# Patient Record
Sex: FEMALE | Race: WHITE | NOT HISPANIC OR LATINO | Employment: FULL TIME | URBAN - METROPOLITAN AREA
[De-identification: names, ages, dates, MRNs, and addresses within clinical notes are randomized per-mention and may not be internally consistent; named-entity substitution may affect disease eponyms.]

---

## 2019-12-24 ENCOUNTER — OFFICE VISIT (OUTPATIENT)
Dept: CARDIOLOGY | Facility: CLINIC | Age: 59
End: 2019-12-24

## 2019-12-24 VITALS
SYSTOLIC BLOOD PRESSURE: 132 MMHG | HEART RATE: 101 BPM | HEIGHT: 62 IN | OXYGEN SATURATION: 98 % | BODY MASS INDEX: 29.44 KG/M2 | DIASTOLIC BLOOD PRESSURE: 82 MMHG | RESPIRATION RATE: 16 BRPM | WEIGHT: 160 LBS

## 2019-12-24 DIAGNOSIS — I25.119 CORONARY ARTERY DISEASE WITH ANGINA PECTORIS, UNSPECIFIED VESSEL OR LESION TYPE, UNSPECIFIED WHETHER NATIVE OR TRANSPLANTED HEART (HCC): Primary | ICD-10-CM

## 2019-12-24 DIAGNOSIS — R07.9 CHEST PAIN, UNSPECIFIED TYPE: ICD-10-CM

## 2019-12-24 PROCEDURE — 93000 ELECTROCARDIOGRAM COMPLETE: CPT | Performed by: INTERNAL MEDICINE

## 2019-12-24 PROCEDURE — 99204 OFFICE O/P NEW MOD 45 MIN: CPT | Performed by: INTERNAL MEDICINE

## 2019-12-24 RX ORDER — ATORVASTATIN CALCIUM 20 MG/1
20 TABLET, FILM COATED ORAL DAILY
Qty: 30 TABLET | Refills: 11 | Status: SHIPPED | OUTPATIENT
Start: 2019-12-24

## 2019-12-24 NOTE — PROGRESS NOTES
[facility]      Patient Name: Lou Bone  :1960  Age: 59 y.o.  Encounter Provider:  Tobi Baldwin Jr, MD      Chief Complaint:   Chief Complaint   Patient presents with   • Chest Pain   • Congestive Heart Failure         HPI  Lou Bone is a 59 y.o. female past medical history coronary artery disease recently diagnosed presents for initial evaluation.  Patient was experiencing increased chest pressure when she presented to [facility] on .  Due to the typical nature of the chest pain and the accelerating frequency of symptoms the patient was taken directly for cardiac catheterization.  A left ventriculogram was performed which showed EF 50 to 60%.  Coronary angiogram showed normal left main left circumflex and RCA.  A 99% mid LAD lesion was noted and PCI performed with 2 CHINTAN.  Patient noted a small hematoma with large ecchymosis in the groin post catheterization.  She was discharged on 122 and has continued to experience shortness of air with temporal relationship to ingestion of ticagrelor.  She denies any further chest pain.  She stopped taking her beta-blocker and atorvastatin as she is not fond of taking multiple medications.  She is a lifelong non-smoker and denies alcohol or illicit drug use.      The following portions of the patient's history were reviewed and updated as appropriate: allergies, current medications, past family history, past medical history, past social history, past surgical history and problem list.      Review of Systems   Constitution: Positive for malaise/fatigue. Negative for chills and fever.   HENT: Negative for hoarse voice and sore throat.    Eyes: Negative for double vision and photophobia.   Cardiovascular: Positive for dyspnea on exertion and palpitations. Negative for chest pain, leg swelling, near-syncope, orthopnea, paroxysmal nocturnal dyspnea and syncope.   Respiratory: Positive for shortness of breath. Negative for cough  "and wheezing.    Skin: Negative for poor wound healing and rash.   Musculoskeletal: Negative for arthritis and joint swelling.   Gastrointestinal: Negative for bloating, abdominal pain, hematemesis and hematochezia.   Neurological: Negative for dizziness and focal weakness.   Psychiatric/Behavioral: Negative for depression and suicidal ideas.   All other systems reviewed and are negative.      OBJECTIVE:   Vital Signs  Vitals:    12/24/19 0935   Pulse: 101   Resp: 16   SpO2: 98%     Estimated body mass index is 29.26 kg/m² as calculated from the following:    Height as of this encounter: 157.5 cm (62\").    Weight as of this encounter: 72.6 kg (160 lb).    Physical Exam   Constitutional: She is oriented to person, place, and time. She appears well-developed and well-nourished.   HENT:   Head: Normocephalic and atraumatic.   Eyes: Pupils are equal, round, and reactive to light. Conjunctivae are normal.   Neck: No JVD present. No thyromegaly present.   Cardiovascular: Exam reveals no gallop and no friction rub.   No murmur heard.  Pulmonary/Chest: No respiratory distress. She exhibits no tenderness.   Abdominal: Bowel sounds are normal. She exhibits no distension.   Musculoskeletal: She exhibits no edema or tenderness.   Neurological: She is alert and oriented to person, place, and time.   Skin: No rash noted. No erythema.   1.5 cm diameter hematoma which is firm noted in the area of the femoral arteriotomy.  Large 5 cm ecchymosis noted in that region as well.  Neurovascular intact.   Psychiatric: She has a normal mood and affect. Judgment normal.   Vitals reviewed.        ECG 12 Lead  Date/Time: 12/24/2019 10:34 AM  Performed by: Tobi Baldwin Jr., MD  Authorized by: Tobi Baldwin Jr., MD   Comparison: not compared with previous ECG   Previous ECG: no previous ECG available  Rhythm: sinus rhythm  T inversion: V1 and V2  T flattening: V3 and V4    Clinical impression: abnormal EKG                  ASSESSMENT:      " Diagnosis Plan   1. Chest pain, unspecified type     2. Coronary artery disease with angina pectoris, unspecified vessel or lesion type, unspecified whether native or transplanted heart (CMS/[facility])           PLAN OF CARE:     1. CAD -status post recent cardiac catheterization and PCI to mid LAD.  Continue aspirin and Brilinta.  She will employ a strategy of caffeine ingestion around the time of taking Brilinta.  She is willing to restart metoprolol and atorvastatin.  Check lipid profile in 3 months.  Cardiac rehab referral to [facility] as the patient lives in [place].  2. Hyperlipidemia -as above    Return to clinic 3 months             Discharge Medications           Accurate as of December 24, 2019  9:51 AM. If you have any questions, ask your nurse or doctor.               Continue These Medications      Instructions Start Date   azithromycin 250 MG tablet  Commonly known as:  ZITHROMAX   Take 2 tablets the first day, then 1 tablet daily for 4 days.      escitalopram 10 MG tablet  Commonly known as:  LEXAPRO   10 mg, Oral, Daily      guaiFENesin 600 MG 12 hr tablet  Commonly known as:  MUCINEX   1,200 mg, Oral, 2 Times Daily      meclizine 25 MG tablet  Commonly known as:  ANTIVERT   25 mg, Oral, 4 Times Daily PRN      meclizine 25 MG tablet  Commonly known as:  ANTIVERT   25 mg, Oral, Every 6 Hours PRN      ticagrelor 90 MG tablet tablet  Commonly known as:  BRILINTA   90 mg, Oral, 2 Times Daily             Thank you for allowing me to participate in the care of your patient,      Sincerely,   Tobi Baldwin MD  [facility]  12/24/19  9:51 AM

## 2020-02-27 ENCOUNTER — TELEPHONE (OUTPATIENT)
Dept: CARDIAC REHAB | Facility: HOSPITAL | Age: 60
End: 2020-02-27

## 2020-02-27 NOTE — TELEPHONE ENCOUNTER
02/27/20- left voice message for pt to call back regarding CR. CR order showed up in [facility] LINDA [facility] yesterday afternoon and had been initiated on 12/24/2019 ?  Pt asked to call CR asap to discuss CR.  MARLEY Stinson RN CCRP

## 2020-02-28 ENCOUNTER — DOCUMENTATION (OUTPATIENT)
Dept: CARDIAC REHAB | Facility: HOSPITAL | Age: 60
End: 2020-02-28

## 2020-03-23 ENCOUNTER — TELEPHONE (OUTPATIENT)
Dept: CARDIOLOGY | Facility: CLINIC | Age: 60
End: 2020-03-23

## 2020-03-23 NOTE — TELEPHONE ENCOUNTER
Called went to  and mailbox is full can not leave message. Need to reschedule up coming appt to telehealth.

## 2020-03-23 NOTE — TELEPHONE ENCOUNTER
Spoke with the patient and she stated that she never got the refill of the Brilinta. (the refill was sent to the wrong [facility], she stated)  She stated that she has been off of the medication for over a month.  She is stating that she is not going to come into the office being she was not able to get the prescription pulled to the correct location.  I let her know that I have not received any messages or refill requests.  I did let her know that it was important that she be on the Brilinta and sent in the refill to her correct pharmacy.  I have called and spoken with Rupal Esparza and verified that the prescription has been received.    Bernabe

## 2022-05-20 ENCOUNTER — HOSPITAL ENCOUNTER (EMERGENCY)
Facility: HOSPITAL | Age: 62
Discharge: HOME | End: 2022-05-20
Attending: EMERGENCY MEDICINE | Admitting: EMERGENCY MEDICINE

## 2022-05-20 ENCOUNTER — APPOINTMENT (OUTPATIENT)
Dept: CT IMAGING | Facility: HOSPITAL | Age: 62
End: 2022-05-20

## 2022-05-20 VITALS
BODY MASS INDEX: 33.13 KG/M2 | HEIGHT: 62 IN | SYSTOLIC BLOOD PRESSURE: 128 MMHG | OXYGEN SATURATION: 96 % | WEIGHT: 180 LBS | RESPIRATION RATE: 18 BRPM | TEMPERATURE: 98.3 F | DIASTOLIC BLOOD PRESSURE: 79 MMHG | HEART RATE: 84 BPM

## 2022-05-20 DIAGNOSIS — S00.01XA ABRASION OF SCALP, INITIAL ENCOUNTER: ICD-10-CM

## 2022-05-20 DIAGNOSIS — S09.90XA HEAD INJURY, CLOSED, WITHOUT LOC, INITIAL ENCOUNTER: Primary | ICD-10-CM

## 2022-05-20 DIAGNOSIS — M54.2 ACUTE NECK PAIN: ICD-10-CM

## 2022-05-20 PROCEDURE — 25010000002 TETANUS-DIPHTH-ACELL PERTUSSIS 5-2.5-18.5 LF-MCG/0.5 SUSPENSION PREFILLED SYRINGE: Performed by: NURSE PRACTITIONER

## 2022-05-20 PROCEDURE — 72125 CT NECK SPINE W/O DYE: CPT

## 2022-05-20 PROCEDURE — 90715 TDAP VACCINE 7 YRS/> IM: CPT | Performed by: NURSE PRACTITIONER

## 2022-05-20 PROCEDURE — 99283 EMERGENCY DEPT VISIT LOW MDM: CPT

## 2022-05-20 PROCEDURE — 70450 CT HEAD/BRAIN W/O DYE: CPT

## 2022-05-20 PROCEDURE — 90471 IMMUNIZATION ADMIN: CPT | Performed by: NURSE PRACTITIONER

## 2022-05-20 RX ORDER — TRAZODONE HYDROCHLORIDE 100 MG/1
100 TABLET ORAL NIGHTLY
COMMUNITY

## 2022-05-20 RX ADMIN — TETANUS TOXOID, REDUCED DIPHTHERIA TOXOID AND ACELLULAR PERTUSSIS VACCINE, ADSORBED 0.5 ML: 5; 2.5; 8; 8; 2.5 SUSPENSION INTRAMUSCULAR at 10:39

## 2022-05-20 NOTE — ED PROVIDER NOTES
EMERGENCY DEPARTMENT ENCOUNTER    Room Number:    Date seen:  2022  Time seen: 09:55 EDT  PCP: Provider, No Known  Historian: Patient    HPI:  Chief complaint: Head injury  A complete HPI/ROS/PMH/PSH/SH/FH are unobtainable due to: Not applicable  Context:Lou Bone is a 61 y.o. female with history of CAD, syncope who presents to the ED with c/o head injury from a tree branch that broke and hit her in the head this morning.  She describes a mild headache and some mild neck pain.  Discomforts are not made better or worse by anything.  She has applied a bag of frozen [place] sprouts to her head.  She is unsure of her last Tdap status.  She states that she was out watering her plants when she heard a tree limb crack in the next thing she knew it hit her in the head.  She denies LOC, seeing stars and has not had any nausea or vomiting.  She does not take blood thinners.    Patient was placed in face mask in first look. Patient was wearing facemask when I entered the room and throughout our encounter. I wore full protective equipment throughout this patient encounter including a N95 face mask, eye shield and gloves. Hand hygiene/washing of hands was performed before donning protective equipment and after removal when leaving the room.    MEDICAL RECORD REVIEW    ALLERGIES  Penicillins    PAST MEDICAL HISTORY  Active Ambulatory Problems     Diagnosis Date Noted   • Chest pain 2019   • Coronary artery disease with angina pectoris (HCC) 2019     Resolved Ambulatory Problems     Diagnosis Date Noted   • No Resolved Ambulatory Problems     Past Medical History:   Diagnosis Date   • Coronary artery disease    • Lightheadedness    • Syncope    • Unstable angina (HCC)        PAST SURGICAL HISTORY  Past Surgical History:   Procedure Laterality Date   •  SECTION         FAMILY HISTORY  Family History   Problem Relation Age of Onset   • Diabetes Mother    • Hypertension Mother    • Heart disease  Mother    • Heart attack Mother    • Diabetes Father    • Hypertension Father    • Diabetes Sister    • Hypertension Sister    • Hypertension Brother    • Diabetes Maternal Aunt    • Heart disease Maternal Uncle    • Diabetes Maternal Uncle    • Heart attack Maternal Uncle    • Heart attack Maternal Grandmother    • Heart attack Maternal Grandfather        SOCIAL HISTORY  Social History     Socioeconomic History   • Marital status: Single   Tobacco Use   • Smoking status: Never Smoker   • Smokeless tobacco: Never Used   Substance and Sexual Activity   • Alcohol use: No   • Drug use: Never   • Sexual activity: Defer       REVIEW OF SYSTEMS  Review of Systems    All systems reviewed and negative except for those discussed in HPI.     PHYSICAL EXAM    ED Triage Vitals   Temp Heart Rate Resp BP SpO2   05/20/22 0907 05/20/22 0907 05/20/22 0907 05/20/22 0926 05/20/22 0907   98.3 °F (36.8 °C) 100 17 129/94 95 %      Temp src Heart Rate Source Patient Position BP Location FiO2 (%)   05/20/22 0907 -- -- -- --   Tympanic         Physical Exam  HENT:      Head:        Comments: Abrasion/contusion with some swelling        I have reviewed the triage vital signs and nursing notes.      GENERAL: not distressed  HENT: nares patent, mm moist, no obvious facial injury.   EYES: no scleral icterus, PERRL, EOMI  NECK: no ROM limitations mild cervical spinal tenderness.  I do not appreciate any step-offs  CV: regular rhythm, regular rate, no murmur, no rubs no gallops  RESPIRATORY: normal effort, CTA B  ABDOMEN: soft  : deferred  MUSCULOSKELETAL: no deformity, no range of motion limitations to bilateral wrist hands, elbows or shoulders.  NEURO: alert, moves all extremities, follows commands  SKIN: warm, dry        RADIOLOGY RESULTS  CT Head Without Contrast, CT Cervical Spine Without Contrast    Result Date: 5/20/2022  CT HEAD AND CERVICAL SPINE WITHOUT CONTRAST  CLINICAL HISTORY:Head injury from a tree branch that hit the patient on  her head this morning. The patient describes mild headache and neck pain.  TECHNIQUE: CT scan of the head was obtained with 2 mm axial bone algorithm and 3 mm axial soft tissue algorithm images. Sagittal and coronal reconstructed images were obtained.  FINDINGS:  There is no evidence for a calvarial fracture. There is no evidence for an acute extra-axial hemorrhage. The ventricles, sulci, and cisterns are age-appropriate. The gray-white matter differentiation is within normal limits. The basal ganglia and thalami are unremarkable in appearance. The posterior fossa structures are within normal limits.  Incidental note is made of a partially empty sella.       No evidence for acute traumatic intracranial pathology.  Incidental note of a partially empty sella.   TECHNIQUE: CT scan of the cervical spine was obtained with 1 mm axial bone algorithm and 2 mm axial soft tissue algorithm images. Sagittal and coronal reconstructed images were obtained.  FINDINGS:  There is loss of the usual lordotic curvature of the cervical spine. Otherwise, there is no evidence to suggest acute fracture or bony malalignment.  Advanced degenerative disc changes are seen at the C5-6 level. A disc osteophyte complex mildly indents the ventral subarachnoid space. There is ossification of the posterior longitudinal ligament along the posterior aspect of the C6 vertebral body that results in mild canal narrowing. There is moderate-to-severe right C5-6 foraminal narrowing secondary to uncovertebral joint hypertrophy.  IMPRESSION:  There is loss of the usual lordotic curvature of the cervical spine. Otherwise, there is no evidence for acute fracture or bony malalignment.  Incidental degenerative phenomena and ossification of the posterior longitudinal ligament along the posterior aspect of the C6 vertebral body as discussed in detail above.      Radiation dose reduction techniques were utilized, including automated exposure control and exposure  modulation based on body size.  This report was finalized on 5/20/2022 11:45 AM by Dr. Alvin Waite M.D.           PROGRESS, DATA ANALYSIS, CONSULTS AND MEDICAL DECISION MAKING  All labs have been independently reviewed by me.  All radiology studies have been reviewed by me and discussed with radiologist dictating the report.  EKG's independently viewed and interpreted by me unless stated otherwise. Discussion below represents my analysis of pertinent findings related to patient's condition, differential diagnosis, treatment plan and final disposition.     ED Course as of 05/20/22 1232   Fri May 20, 2022   1032 I did a 2nd evaluation of the scalp abrasion.  It is approximated and does not need sutures.  [EW]   1121 CT scan head and cervical spine are negative for any acute abnormalities.    MDM/dispo:  Tdap has been updated.  She does not need sutures.  Will have her rest today.  No neurological abnormalities.  [EW]      ED Course User Index  [EW] Kathie Gray, PHAN     DDx: ICH, SAH, scalp contusion hematoma, cervical spine injury      Reviewed pt's history and workup with Dr. Telles.  After a bedside evaluation, Dr. Telles agrees with the plan of care.    The patient's history, physical exam, and lab findings were discussed with the physician, who also performed a face to face history and physical exam.  I discussed all results and noted any abnormalities with patient.  Discussed absoute need to recheck abnormalities with their family physician.  I answered any of the patient's questions.  Discussed plan for discharge, as there is no emergent indication for admission.  Pt is agreeable and understands need for follow up and repeat testing.  Pt is aware that discharge does not mean that nothing is wrong but it indicates no emergency is present and they must continue care with their family physician.  Pt is discharged with instructions to follow up with primary care doctor to have their blood pressure rechecked.  "        Disposition vitals:  /79   Pulse 84   Temp 98.3 °F (36.8 °C) (Tympanic)   Resp 18   Ht 157.5 cm (62\")   Wt 81.6 kg (180 lb)   SpO2 96%   BMI 32.92 kg/m²       DIAGNOSIS  Final diagnoses:   Head injury, closed, without LOC, initial encounter   Acute neck pain   Abrasion of scalp, initial encounter       FOLLOW UP   PATIENT CONNECTION - [place] [id number] [phone]  Schedule an appointment as soon as possible for a visit in 1 week           Kathie Gray, APRN  05/20/22 1232    "

## 2022-05-20 NOTE — ED TRIAGE NOTES
C/O head injury S/P tree branch fell and hit patient, denies LOC, not on blood thinners     Mask placed on patient in triage. Triage staff wore appropriate PPE during interaction with patient.

## 2022-05-20 NOTE — DISCHARGE INSTRUCTIONS
Please expect soreness    No work today    It is ok to shampoo the hair.  Applied light film of bacitracin ointment or other over-the-counter triple antibiotic appointment to the scalp abrasion.    Can continue to use ice.    Return Precautions    Although you are being discharged from the ED today, I encourage you to return for worsening symptoms.  Things can, and do, change such that treatment at home with medication may not be adequate.      Specifically, return for any of the following:    Chest pain, shortness of breath, pain or nausea and vomiting not controlled by medications provided.    Please make a follow up with your Primary Care Provider for a blood pressure recheck.

## 2023-08-30 ENCOUNTER — APPOINTMENT (OUTPATIENT)
Dept: GENERAL RADIOLOGY | Facility: HOSPITAL | Age: 63
End: 2023-08-30

## 2023-08-30 ENCOUNTER — HOSPITAL ENCOUNTER (OUTPATIENT)
Facility: HOSPITAL | Age: 63
Discharge: HOME | End: 2023-08-30
Attending: EMERGENCY MEDICINE | Admitting: EMERGENCY MEDICINE
Payer: MEDICAID

## 2023-08-30 VITALS
WEIGHT: 190 LBS | TEMPERATURE: 98.7 F | RESPIRATION RATE: 16 BRPM | DIASTOLIC BLOOD PRESSURE: 85 MMHG | HEIGHT: 62 IN | OXYGEN SATURATION: 92 % | HEART RATE: 102 BPM | SYSTOLIC BLOOD PRESSURE: 137 MMHG | BODY MASS INDEX: 34.96 KG/M2

## 2023-08-30 DIAGNOSIS — J06.9 UPPER RESPIRATORY TRACT INFECTION, UNSPECIFIED TYPE: ICD-10-CM

## 2023-08-30 DIAGNOSIS — J20.9 ACUTE BRONCHITIS, UNSPECIFIED ORGANISM: Primary | ICD-10-CM

## 2023-08-30 LAB
FLUAV SUBTYP SPEC NAA+PROBE: NOT DETECTED
FLUBV RNA ISLT QL NAA+PROBE: NOT DETECTED
SARS-COV-2 RNA RESP QL NAA+PROBE: NOT DETECTED
STREP A PCR: NOT DETECTED

## 2023-08-30 PROCEDURE — 71045 X-RAY EXAM CHEST 1 VIEW: CPT

## 2023-08-30 PROCEDURE — 87651 STREP A DNA AMP PROBE: CPT | Performed by: EMERGENCY MEDICINE

## 2023-08-30 PROCEDURE — 87636 SARSCOV2 & INF A&B AMP PRB: CPT | Performed by: EMERGENCY MEDICINE

## 2023-08-30 PROCEDURE — G0463 HOSPITAL OUTPT CLINIC VISIT: HCPCS | Performed by: EMERGENCY MEDICINE

## 2023-08-30 RX ORDER — BENZONATATE 100 MG/1
100 CAPSULE ORAL 3 TIMES DAILY PRN
Qty: 20 CAPSULE | Refills: 0 | Status: SHIPPED | OUTPATIENT
Start: 2023-08-30

## 2023-08-30 RX ORDER — AZITHROMYCIN 250 MG/1
TABLET, FILM COATED ORAL
Qty: 6 TABLET | Refills: 0 | Status: SHIPPED | OUTPATIENT
Start: 2023-08-30

## 2023-08-30 RX ORDER — PREDNISONE 20 MG/1
40 TABLET ORAL DAILY
Qty: 10 TABLET | Refills: 0 | Status: SHIPPED | OUTPATIENT
Start: 2023-08-30 | End: 2023-09-04

## 2023-08-30 NOTE — Clinical Note
[facility] [place] [address] [place] [address] [place] [address]  Phone: [phone]    Lou Bone was seen and treated in our emergency department on 8/30/2023.  She may return to work on 09/04/2023.         Thank you for choosing [facility].    Vj Trejo MD

## 2023-08-30 NOTE — FSED PROVIDER NOTE
Subjective   History of Present Illness  Patient is a 62-year-old has not been feeling well for several days.  She states she has had flulike symptoms.  Cough congestion and body aches.  She does not feel well.    Patient denies any nausea or vomiting.    Review of Systems   Constitutional:  Positive for fatigue.   HENT:  Positive for congestion. Negative for rhinorrhea and sore throat.    Eyes: Negative.    Respiratory:  Positive for cough. Negative for chest tightness and shortness of breath.    Cardiovascular: Negative.    Gastrointestinal: Negative.  Negative for abdominal pain, diarrhea, nausea and vomiting.   Endocrine: Negative.    Genitourinary: Negative.  Negative for dysuria.   Musculoskeletal:  Negative for back pain.   Skin: Negative.    Allergic/Immunologic: Negative.    Neurological: Negative.    Hematological: Negative.    Psychiatric/Behavioral: Negative.     All other systems reviewed and are negative.    Past Medical History:   Diagnosis Date    Chest pain     Coronary artery disease     Lightheadedness     Syncope     Unstable angina        Allergies   Allergen Reactions    Penicillins Rash       Past Surgical History:   Procedure Laterality Date     SECTION         Family History   Problem Relation Age of Onset    Diabetes Mother     Hypertension Mother     Heart disease Mother     Heart attack Mother     Diabetes Father     Hypertension Father     Diabetes Sister     Hypertension Sister     Hypertension Brother     Diabetes Maternal Aunt     Heart disease Maternal Uncle     Diabetes Maternal Uncle     Heart attack Maternal Uncle     Heart attack Maternal Grandmother     Heart attack Maternal Grandfather        Social History     Socioeconomic History    Marital status: Single   Tobacco Use    Smoking status: Never    Smokeless tobacco: Never   Substance and Sexual Activity    Alcohol use: No    Drug use: Never    Sexual activity: Defer           Objective   Physical Exam  Vitals and  nursing note reviewed.   Constitutional:       Appearance: She is well-developed and normal weight.   HENT:      Head: Normocephalic and atraumatic.      Right Ear: External ear normal.      Left Ear: External ear normal.      Nose: Nose normal.      Mouth/Throat:      Mouth: Mucous membranes are moist.      Pharynx: Oropharynx is clear.   Eyes:      Extraocular Movements: Extraocular movements intact.      Pupils: Pupils are equal, round, and reactive to light.   Cardiovascular:      Rate and Rhythm: Normal rate and regular rhythm.      Pulses: Normal pulses.      Heart sounds: Normal heart sounds.   Pulmonary:      Effort: Pulmonary effort is normal.      Breath sounds: Normal breath sounds.   Abdominal:      General: Abdomen is flat.      Palpations: Abdomen is soft.      Tenderness: There is no abdominal tenderness.   Musculoskeletal:         General: Normal range of motion.      Cervical back: Normal range of motion and neck supple.   Skin:     General: Skin is warm and dry.   Neurological:      General: No focal deficit present.      Mental Status: She is alert and oriented to person, place, and time. Mental status is at baseline.   Psychiatric:         Mood and Affect: Mood normal.         Behavior: Behavior normal.         Thought Content: Thought content normal.         Judgment: Judgment normal.       Procedures           ED Course  ED Course as of 08/30/23 1200   Wed Aug 30, 2023   1150 XR Chest 1 View  IMPRESSION:  Impression:  Mild bibasilar airspace disease may represent mild atelectasis or developing pneumonitis   [CT]   1150 Negative flu strep and COVID. [CT]   1151   Possible early pneumonia.  Patient be treated with antibiotics. [CT]      ED Course User Index  [CT] Vj Trejo MD                                           Medical Decision Making  This patient presents with acute cough, most consistent with uri bronchitis Presentation not consistent with acute bacterial pneumonia, influenza,  asthma, transient airway hyperresponsiveness. Presentation not consistent with chronic causes of cough (including GERD, asthma, postnasal discharge, medication side effect, CHF, lung cancer or mass).      This patient presents with symptoms suspicious for likely  upper respiratory infection. Based on history and physical doubt sinusitis. COVID test was sent off and pending. Do not suspect underlying cardiopulmonary process. I considered, but think unlikely, dangerous causes of this patient's symptoms to include ACS, CHF or COPD exacerbations, pneumonia, pneumothorax.     Flu and COVID were negative.  Strep was negative.  Chest x-ray shows atelectasis versus mildly developing pneumonitis.  The patient be discharged on some steroids and Tessalon Perles and Zithromax.    Amount and/or Complexity of Data Reviewed  Radiology: ordered. Decision-making details documented in ED Course.        Final diagnoses:   Acute bronchitis, unspecified organism   Upper respiratory tract infection, unspecified type       ED Disposition  ED Disposition       ED Disposition   Discharge    Condition   Stable    Comment   --               Your primary care doctor    Schedule an appointment as soon as possible for a visit in 1 day      PATIENT CONNECTION - [facility] [id number]  [phone]             Medication List        New Prescriptions      benzonatate 100 MG capsule  Commonly known as: TESSALON  Take 1 capsule by mouth 3 (Three) Times a Day As Needed for Cough.     predniSONE 20 MG tablet  Commonly known as: DELTASONE  Take 2 tablets by mouth Daily for 5 days.            Changed      azithromycin 250 MG tablet  Commonly known as: Zithromax Z-Solomon  Take 2 tablets by mouth on day 1, then 1 tablet daily on days 2-5  What changed: additional instructions               Where to Get Your Medications        These medications were sent to [id number] [place], [place] - [address] [facility] [phone] [facility] [phone]  [address]  [address] [place] [address]      Phone: [phone]   azithromycin 250 MG tablet  benzonatate 100 MG capsule  predniSONE 20 MG tablet

## 2023-08-30 NOTE — Clinical Note
[place] [place] [address] [place] [address] [place] [address]  Phone: [phone]    Lou Bone was seen and treated in our emergency department on 8/30/2023.  She may return to work on 08/31/2023.         Thank you for choosing [facility].    Vj Trejo MD

## 2023-08-30 NOTE — DISCHARGE INSTRUCTIONS
Follow-up with your primary care doctor tomorrow for reevaluation.  Return for worsening symptoms.